# Patient Record
Sex: MALE | Race: BLACK OR AFRICAN AMERICAN | NOT HISPANIC OR LATINO | URBAN - METROPOLITAN AREA
[De-identification: names, ages, dates, MRNs, and addresses within clinical notes are randomized per-mention and may not be internally consistent; named-entity substitution may affect disease eponyms.]

---

## 2023-10-22 PROBLEM — H26.493 PCO (POSTERIOR CAPSULAR OPACIFICATION), BILATERAL: Status: ACTIVE | Noted: 2023-10-22

## 2023-10-22 PROBLEM — Z97.3 WEARS READING EYEGLASSES: Status: ACTIVE | Noted: 2023-10-22

## 2023-10-22 PROBLEM — H26.40 SECONDARY CATARACT: Status: ACTIVE | Noted: 2023-10-22

## 2023-10-22 RX ORDER — LISINOPRIL 30 MG/1
TABLET ORAL
COMMUNITY

## 2023-10-22 RX ORDER — ACETAMINOPHEN 500 MG
TABLET ORAL
COMMUNITY

## 2024-01-02 ENCOUNTER — OFFICE VISIT (OUTPATIENT)
Dept: OPHTHALMOLOGY | Facility: CLINIC | Age: 84
End: 2024-01-02
Payer: COMMERCIAL

## 2024-01-02 DIAGNOSIS — Z96.1 PSEUDOPHAKIA: ICD-10-CM

## 2024-01-02 DIAGNOSIS — H04.123 DRY EYE SYNDROME OF LACRIMAL GLAND, BILATERAL: ICD-10-CM

## 2024-01-02 DIAGNOSIS — H52.4 PRESBYOPIA OF BOTH EYES: ICD-10-CM

## 2024-01-02 DIAGNOSIS — H52.223 REGULAR ASTIGMATISM OF BOTH EYES: ICD-10-CM

## 2024-01-02 DIAGNOSIS — H43.813 PVD (POSTERIOR VITREOUS DETACHMENT), BOTH EYES: Primary | ICD-10-CM

## 2024-01-02 PROCEDURE — 92004 COMPRE OPH EXAM NEW PT 1/>: CPT | Performed by: STUDENT IN AN ORGANIZED HEALTH CARE EDUCATION/TRAINING PROGRAM

## 2024-01-02 RX ORDER — DOXYCYCLINE 100 MG/1
CAPSULE ORAL
COMMUNITY
Start: 2023-03-07

## 2024-01-02 RX ORDER — ATORVASTATIN CALCIUM 40 MG/1
TABLET, FILM COATED ORAL
COMMUNITY
Start: 2023-09-25

## 2024-01-02 RX ORDER — LOSARTAN POTASSIUM 25 MG/1
TABLET ORAL
COMMUNITY
Start: 2023-09-25

## 2024-01-02 RX ORDER — CEPHALEXIN 500 MG/1
CAPSULE ORAL
COMMUNITY
Start: 2023-09-25

## 2024-01-02 RX ORDER — CARVEDILOL 6.25 MG/1
TABLET ORAL
COMMUNITY
Start: 2023-09-25

## 2024-01-02 ASSESSMENT — ENCOUNTER SYMPTOMS
RESPIRATORY NEGATIVE: 0
ALLERGIC/IMMUNOLOGIC NEGATIVE: 0
MUSCULOSKELETAL NEGATIVE: 0
PSYCHIATRIC NEGATIVE: 0
GASTROINTESTINAL NEGATIVE: 0
CARDIOVASCULAR NEGATIVE: 0
EYES NEGATIVE: 0
HEMATOLOGIC/LYMPHATIC NEGATIVE: 0
NEUROLOGICAL NEGATIVE: 0
ENDOCRINE NEGATIVE: 0
CONSTITUTIONAL NEGATIVE: 0

## 2024-01-02 ASSESSMENT — VISUAL ACUITY
OD_SC+: -2
OS_SC: 20/40
METHOD: SNELLEN - LINEAR
OS_SC+: -2
OD_SC: 20/25

## 2024-01-02 ASSESSMENT — REFRACTION_MANIFEST
OD_AXIS: 169
OS_AXIS: 161
OD_AXIS: 170
OS_SPHERE: -0.50
OD_CYLINDER: +0.75
METHOD_AUTOREFRACTION: 1
OD_ADD: +2.50
OD_SPHERE: PLANO
OS_CYLINDER: +1.25
OS_AXIS: 161
OS_ADD: +2.50
OS_CYLINDER: +1.25
OD_CYLINDER: +0.75
OS_SPHERE: -0.50
OD_SPHERE: PLANO

## 2024-01-02 ASSESSMENT — TONOMETRY
OS_IOP_MMHG: 18
OD_IOP_MMHG: 17
IOP_METHOD: GOLDMANN APPLANATION

## 2024-01-02 ASSESSMENT — EXTERNAL EXAM - LEFT EYE: OS_EXAM: NORMAL

## 2024-01-02 ASSESSMENT — CONF VISUAL FIELD
METHOD: COUNTING FINGERS
OD_SUPERIOR_TEMPORAL_RESTRICTION: 0
OS_INFERIOR_TEMPORAL_RESTRICTION: 0
OS_INFERIOR_NASAL_RESTRICTION: 0
OS_SUPERIOR_TEMPORAL_RESTRICTION: 0
OD_SUPERIOR_NASAL_RESTRICTION: 0
OS_NORMAL: 1
OD_INFERIOR_NASAL_RESTRICTION: 0
OD_NORMAL: 1
OS_SUPERIOR_NASAL_RESTRICTION: 0
OD_INFERIOR_TEMPORAL_RESTRICTION: 0

## 2024-01-02 ASSESSMENT — EXTERNAL EXAM - RIGHT EYE: OD_EXAM: NORMAL

## 2024-01-02 ASSESSMENT — CUP TO DISC RATIO
OS_RATIO: .3
OD_RATIO: .3

## 2024-01-02 NOTE — PROGRESS NOTES
Assessment/Plan   Diagnoses and all orders for this visit:  PVD (posterior vitreous detachment), both eyes  -healthy retinal exam  -Discussed signs and symtpoms of retinal hole, tear, detachment. Patient educated that retinal detachment can lead to permanent vision loss. Patient consents to return if they notice new floaters, flashes, curtain or veil covering vision.  Pseudophakia  Presbyopia of both eyes  Regular astigmatism of both eyes  -s/p YAG laser os 12/8/18   -patient doing well with OTC NVO specs  -patient defers FTW specs at this time  -BCVA OD 20/25 OS 20/30 from ocular surface changes with dry eye  Dry eye syndrome of lacrimal gland, bilateral  -discussed using OTC AT's TID, lul at bedtime, and warm compresses    RTC 1 year for annual with DFE, MRX, OCT MAC

## 2025-02-22 NOTE — PROGRESS NOTES
Imaging    Macula OCT 02/24/25  Right eye (OD): Normal contour and appearance, no IRF/subretinal fluid (SRF)  Left eye (OS): Normal contour and appearance, no IRF/subretinal fluid (SRF)    Assessment/Plan     Referral from Dr. Diaz seen 1/2/25    #PVD OU  -No flashes, occasional floaters  -No breaks/tears on exam, normal retinal exam    #Dry eye OU   -Right eye running/watering a lot  -Mild PEE OD>OS on exam  -Using Ats about BID-TID, recommend increasing to QID OU and starting lul QHS per Dr. Diaz's recc's    #Presbyopia OU  -Having some trouble with reading using current OTC readers  -Stating current OTC readers somewhat helping but not quite as clear, using 1.75's  -Recc increasing OTC readers to +2.50 per Dr. Diaz's Mrx    Due to see Dr. Diaz in about 10 months, recommend contacting office if increased lubrication regimen and change in OTC readers fail to resolve symptoms

## 2025-02-24 ENCOUNTER — APPOINTMENT (OUTPATIENT)
Dept: OPHTHALMOLOGY | Facility: CLINIC | Age: 85
End: 2025-02-24
Payer: COMMERCIAL

## 2025-02-24 DIAGNOSIS — H52.4 PRESBYOPIA: ICD-10-CM

## 2025-02-24 DIAGNOSIS — H04.123 DRY EYES, BILATERAL: ICD-10-CM

## 2025-02-24 DIAGNOSIS — H43.813 PVD (POSTERIOR VITREOUS DETACHMENT), BOTH EYES: Primary | ICD-10-CM

## 2025-02-24 PROCEDURE — 99213 OFFICE O/P EST LOW 20 MIN: CPT | Performed by: OPHTHALMOLOGY

## 2025-02-24 PROCEDURE — 92134 CPTRZ OPH DX IMG PST SGM RTA: CPT | Performed by: OPHTHALMOLOGY

## 2025-02-24 ASSESSMENT — VISUAL ACUITY
OS_SC: 20/20
OD_PH_SC: 20/20
OD_SC+: -2
OD_SC: 20/30
OD_PH_SC+: -2
METHOD: SNELLEN - LINEAR

## 2025-02-24 ASSESSMENT — CONF VISUAL FIELD
OS_INFERIOR_NASAL_RESTRICTION: 0
OD_SUPERIOR_TEMPORAL_RESTRICTION: 0
OD_SUPERIOR_NASAL_RESTRICTION: 0
OD_INFERIOR_TEMPORAL_RESTRICTION: 0
OS_INFERIOR_TEMPORAL_RESTRICTION: 0
OS_SUPERIOR_TEMPORAL_RESTRICTION: 0
OD_NORMAL: 1
OS_NORMAL: 1
OS_SUPERIOR_NASAL_RESTRICTION: 0
OD_INFERIOR_NASAL_RESTRICTION: 0

## 2025-02-24 ASSESSMENT — EXTERNAL EXAM - RIGHT EYE: OD_EXAM: NORMAL

## 2025-02-24 ASSESSMENT — TONOMETRY
IOP_METHOD: GOLDMANN APPLANATION
OD_IOP_MMHG: 16
OS_IOP_MMHG: 17

## 2025-02-24 ASSESSMENT — CUP TO DISC RATIO
OS_RATIO: .3
OD_RATIO: .3

## 2025-02-24 ASSESSMENT — EXTERNAL EXAM - LEFT EYE: OS_EXAM: NORMAL

## 2025-09-30 ENCOUNTER — APPOINTMENT (OUTPATIENT)
Dept: OPHTHALMOLOGY | Facility: CLINIC | Age: 85
End: 2025-09-30
Payer: MEDICARE

## 2025-12-09 ENCOUNTER — APPOINTMENT (OUTPATIENT)
Dept: OPHTHALMOLOGY | Facility: CLINIC | Age: 85
End: 2025-12-09
Payer: COMMERCIAL